# Patient Record
Sex: FEMALE | Race: WHITE | Employment: FULL TIME | URBAN - METROPOLITAN AREA
[De-identification: names, ages, dates, MRNs, and addresses within clinical notes are randomized per-mention and may not be internally consistent; named-entity substitution may affect disease eponyms.]

---

## 2017-02-09 PROBLEM — R10.11 RIGHT UPPER QUADRANT ABDOMINAL PAIN: Status: ACTIVE | Noted: 2017-02-09

## 2017-02-09 PROBLEM — I10 ESSENTIAL HYPERTENSION: Status: ACTIVE | Noted: 2017-02-09

## 2017-02-09 PROBLEM — K76.6 PORTAL HYPERTENSION (HCC): Status: ACTIVE | Noted: 2017-02-09

## 2017-02-09 PROBLEM — R79.89 ELEVATED LFTS: Status: ACTIVE | Noted: 2017-02-09

## 2017-02-09 PROBLEM — K74.60 CIRRHOSIS OF LIVER (HCC): Status: ACTIVE | Noted: 2017-02-09

## 2017-02-09 PROBLEM — F32.A DEPRESSION: Status: ACTIVE | Noted: 2017-02-09

## 2017-02-09 PROBLEM — K81.0 ACUTE CHOLECYSTITIS: Status: ACTIVE | Noted: 2017-02-09

## 2019-08-15 ENCOUNTER — APPOINTMENT (OUTPATIENT)
Dept: GENERAL RADIOLOGY | Age: 64
End: 2019-08-15
Payer: COMMERCIAL

## 2019-08-15 ENCOUNTER — HOSPITAL ENCOUNTER (EMERGENCY)
Age: 64
Discharge: HOME | End: 2019-08-15
Payer: COMMERCIAL

## 2019-08-15 VITALS
OXYGEN SATURATION: 98 % | DIASTOLIC BLOOD PRESSURE: 88 MMHG | WEIGHT: 175 LBS | HEIGHT: 66 IN | SYSTOLIC BLOOD PRESSURE: 150 MMHG | HEART RATE: 70 BPM | BODY MASS INDEX: 28.12 KG/M2 | TEMPERATURE: 98.3 F | RESPIRATION RATE: 14 BRPM

## 2019-08-15 DIAGNOSIS — M67.911 ROTATOR CUFF DYSFUNCTION, RIGHT: ICD-10-CM

## 2019-08-15 DIAGNOSIS — M25.511 RIGHT SHOULDER PAIN, UNSPECIFIED CHRONICITY: Primary | ICD-10-CM

## 2019-08-15 PROCEDURE — 99283 EMERGENCY DEPT VISIT LOW MDM: CPT

## 2019-08-15 PROCEDURE — 73030 X-RAY EXAM OF SHOULDER: CPT

## 2019-08-15 RX ORDER — NAPROXEN 500 MG/1
500 TABLET ORAL 2 TIMES DAILY
Qty: 60 TABLET | Refills: 0 | Status: SHIPPED | OUTPATIENT
Start: 2019-08-15

## 2019-08-15 RX ORDER — NAPROXEN 500 MG/1
500 TABLET ORAL 2 TIMES DAILY
Qty: 60 TABLET | Refills: 0 | Status: SHIPPED | OUTPATIENT
Start: 2019-08-15 | End: 2019-08-15 | Stop reason: SDUPTHER

## 2019-08-15 ASSESSMENT — PAIN DESCRIPTION - LOCATION: LOCATION: ARM

## 2019-08-15 ASSESSMENT — PAIN SCALES - GENERAL: PAINLEVEL_OUTOF10: 5

## 2019-08-15 ASSESSMENT — PAIN DESCRIPTION - ONSET: ONSET: ON-GOING

## 2019-08-15 ASSESSMENT — PAIN DESCRIPTION - PAIN TYPE: TYPE: ACUTE PAIN

## 2019-08-15 ASSESSMENT — PAIN DESCRIPTION - DESCRIPTORS: DESCRIPTORS: BURNING;ACHING

## 2019-08-15 ASSESSMENT — PAIN DESCRIPTION - FREQUENCY: FREQUENCY: CONTINUOUS

## 2019-08-15 ASSESSMENT — PAIN DESCRIPTION - ORIENTATION: ORIENTATION: RIGHT

## 2019-08-15 ASSESSMENT — PAIN DESCRIPTION - PROGRESSION: CLINICAL_PROGRESSION: GRADUALLY WORSENING

## 2022-03-15 ENCOUNTER — OFFICE VISIT (OUTPATIENT)
Dept: FAMILY MEDICINE CLINIC | Age: 67
End: 2022-03-15
Payer: COMMERCIAL

## 2022-03-15 VITALS
OXYGEN SATURATION: 98 % | BODY MASS INDEX: 30.92 KG/M2 | HEART RATE: 76 BPM | DIASTOLIC BLOOD PRESSURE: 82 MMHG | WEIGHT: 192.4 LBS | TEMPERATURE: 97.7 F | HEIGHT: 66 IN | SYSTOLIC BLOOD PRESSURE: 128 MMHG | RESPIRATION RATE: 16 BRPM

## 2022-03-15 DIAGNOSIS — F32.0 MILD MAJOR DEPRESSION (HCC): ICD-10-CM

## 2022-03-15 DIAGNOSIS — K76.6 PORTAL HYPERTENSION (HCC): ICD-10-CM

## 2022-03-15 DIAGNOSIS — I10 ESSENTIAL HYPERTENSION: Primary | ICD-10-CM

## 2022-03-15 DIAGNOSIS — F41.9 ANXIETY: ICD-10-CM

## 2022-03-15 DIAGNOSIS — Z13.31 POSITIVE DEPRESSION SCREENING: ICD-10-CM

## 2022-03-15 DIAGNOSIS — I10 ESSENTIAL HYPERTENSION: ICD-10-CM

## 2022-03-15 DIAGNOSIS — K70.30 ALCOHOLIC CIRRHOSIS OF LIVER WITHOUT ASCITES (HCC): ICD-10-CM

## 2022-03-15 LAB
ALBUMIN SERPL-MCNC: 4.5 G/DL (ref 3.5–5.2)
ALP BLD-CCNC: 131 U/L (ref 35–104)
ALT SERPL-CCNC: 20 U/L (ref 0–32)
ANION GAP SERPL CALCULATED.3IONS-SCNC: 11 MMOL/L (ref 7–16)
AST SERPL-CCNC: 33 U/L (ref 0–31)
BASOPHILS ABSOLUTE: 0.03 E9/L (ref 0–0.2)
BASOPHILS RELATIVE PERCENT: 0.5 % (ref 0–2)
BILIRUB SERPL-MCNC: 0.6 MG/DL (ref 0–1.2)
BUN BLDV-MCNC: 16 MG/DL (ref 6–23)
CALCIUM SERPL-MCNC: 10 MG/DL (ref 8.6–10.2)
CHLORIDE BLD-SCNC: 104 MMOL/L (ref 98–107)
CHOLESTEROL, TOTAL: 172 MG/DL (ref 0–199)
CO2: 25 MMOL/L (ref 22–29)
CREAT SERPL-MCNC: 0.6 MG/DL (ref 0.5–1)
EOSINOPHILS ABSOLUTE: 0.06 E9/L (ref 0.05–0.5)
EOSINOPHILS RELATIVE PERCENT: 1.1 % (ref 0–6)
GFR AFRICAN AMERICAN: >60
GFR NON-AFRICAN AMERICAN: >60 ML/MIN/1.73
GLUCOSE BLD-MCNC: 137 MG/DL (ref 74–99)
HCT VFR BLD CALC: 47.2 % (ref 34–48)
HDLC SERPL-MCNC: 52 MG/DL
HEMOGLOBIN: 15.1 G/DL (ref 11.5–15.5)
IMMATURE GRANULOCYTES #: 0.02 E9/L
IMMATURE GRANULOCYTES %: 0.4 % (ref 0–5)
LDL CHOLESTEROL CALCULATED: 108 MG/DL (ref 0–99)
LYMPHOCYTES ABSOLUTE: 1.18 E9/L (ref 1.5–4)
LYMPHOCYTES RELATIVE PERCENT: 21.2 % (ref 20–42)
MCH RBC QN AUTO: 29.7 PG (ref 26–35)
MCHC RBC AUTO-ENTMCNC: 32 % (ref 32–34.5)
MCV RBC AUTO: 92.7 FL (ref 80–99.9)
MONOCYTES ABSOLUTE: 0.49 E9/L (ref 0.1–0.95)
MONOCYTES RELATIVE PERCENT: 8.8 % (ref 2–12)
NEUTROPHILS ABSOLUTE: 3.78 E9/L (ref 1.8–7.3)
NEUTROPHILS RELATIVE PERCENT: 68 % (ref 43–80)
PDW BLD-RTO: 14.1 FL (ref 11.5–15)
PLATELET # BLD: 86 E9/L (ref 130–450)
PLATELET CONFIRMATION: NORMAL
PMV BLD AUTO: 12.6 FL (ref 7–12)
POTASSIUM SERPL-SCNC: 4 MMOL/L (ref 3.5–5)
RBC # BLD: 5.09 E12/L (ref 3.5–5.5)
SODIUM BLD-SCNC: 140 MMOL/L (ref 132–146)
TOTAL PROTEIN: 7.3 G/DL (ref 6.4–8.3)
TRIGL SERPL-MCNC: 60 MG/DL (ref 0–149)
VLDLC SERPL CALC-MCNC: 12 MG/DL
WBC # BLD: 5.6 E9/L (ref 4.5–11.5)

## 2022-03-15 PROCEDURE — 99204 OFFICE O/P NEW MOD 45 MIN: CPT | Performed by: INTERNAL MEDICINE

## 2022-03-15 RX ORDER — BUPROPION HYDROCHLORIDE 150 MG/1
150 TABLET ORAL EVERY MORNING
Qty: 30 TABLET | Refills: 2 | Status: SHIPPED
Start: 2022-03-15 | End: 2022-03-31 | Stop reason: SDUPTHER

## 2022-03-15 RX ORDER — BUPROPION HYDROCHLORIDE 150 MG/1
150 TABLET ORAL EVERY MORNING
COMMUNITY
End: 2022-03-15 | Stop reason: SDUPTHER

## 2022-03-15 RX ORDER — PROPRANOLOL HYDROCHLORIDE 40 MG/1
40 TABLET ORAL 2 TIMES DAILY
Qty: 180 TABLET | Refills: 3 | Status: SHIPPED | OUTPATIENT
Start: 2022-03-15 | End: 2023-03-10

## 2022-03-15 RX ORDER — PROPRANOLOL HYDROCHLORIDE 40 MG/1
40 TABLET ORAL 2 TIMES DAILY
COMMUNITY
End: 2022-03-15 | Stop reason: SDUPTHER

## 2022-03-15 RX ORDER — BUPROPION HYDROCHLORIDE 150 MG/1
150 TABLET ORAL EVERY MORNING
Qty: 30 TABLET | Status: CANCELLED | OUTPATIENT
Start: 2022-03-15

## 2022-03-15 SDOH — ECONOMIC STABILITY: FOOD INSECURITY: WITHIN THE PAST 12 MONTHS, THE FOOD YOU BOUGHT JUST DIDN'T LAST AND YOU DIDN'T HAVE MONEY TO GET MORE.: NEVER TRUE

## 2022-03-15 SDOH — ECONOMIC STABILITY: FOOD INSECURITY: WITHIN THE PAST 12 MONTHS, YOU WORRIED THAT YOUR FOOD WOULD RUN OUT BEFORE YOU GOT MONEY TO BUY MORE.: NEVER TRUE

## 2022-03-15 ASSESSMENT — PATIENT HEALTH QUESTIONNAIRE - PHQ9
SUM OF ALL RESPONSES TO PHQ QUESTIONS 1-9: 20
SUM OF ALL RESPONSES TO PHQ QUESTIONS 1-9: 20
9. THOUGHTS THAT YOU WOULD BE BETTER OFF DEAD, OR OF HURTING YOURSELF: 0
3. TROUBLE FALLING OR STAYING ASLEEP: 3
4. FEELING TIRED OR HAVING LITTLE ENERGY: 3
2. FEELING DOWN, DEPRESSED OR HOPELESS: 3
7. TROUBLE CONCENTRATING ON THINGS, SUCH AS READING THE NEWSPAPER OR WATCHING TELEVISION: 3
8. MOVING OR SPEAKING SO SLOWLY THAT OTHER PEOPLE COULD HAVE NOTICED. OR THE OPPOSITE, BEING SO FIGETY OR RESTLESS THAT YOU HAVE BEEN MOVING AROUND A LOT MORE THAN USUAL: 0
SUM OF ALL RESPONSES TO PHQ QUESTIONS 1-9: 20
SUM OF ALL RESPONSES TO PHQ QUESTIONS 1-9: 20
6. FEELING BAD ABOUT YOURSELF - OR THAT YOU ARE A FAILURE OR HAVE LET YOURSELF OR YOUR FAMILY DOWN: 3
5. POOR APPETITE OR OVEREATING: 2
1. LITTLE INTEREST OR PLEASURE IN DOING THINGS: 3
10. IF YOU CHECKED OFF ANY PROBLEMS, HOW DIFFICULT HAVE THESE PROBLEMS MADE IT FOR YOU TO DO YOUR WORK, TAKE CARE OF THINGS AT HOME, OR GET ALONG WITH OTHER PEOPLE: 3
SUM OF ALL RESPONSES TO PHQ9 QUESTIONS 1 & 2: 6

## 2022-03-15 ASSESSMENT — COLUMBIA-SUICIDE SEVERITY RATING SCALE - C-SSRS
6. HAVE YOU EVER DONE ANYTHING, STARTED TO DO ANYTHING, OR PREPARED TO DO ANYTHING TO END YOUR LIFE?: NO
2. HAVE YOU ACTUALLY HAD ANY THOUGHTS OF KILLING YOURSELF?: NO
1. WITHIN THE PAST MONTH, HAVE YOU WISHED YOU WERE DEAD OR WISHED YOU COULD GO TO SLEEP AND NOT WAKE UP?: NO

## 2022-03-15 ASSESSMENT — SOCIAL DETERMINANTS OF HEALTH (SDOH): HOW HARD IS IT FOR YOU TO PAY FOR THE VERY BASICS LIKE FOOD, HOUSING, MEDICAL CARE, AND HEATING?: NOT HARD AT ALL

## 2022-03-15 NOTE — PROGRESS NOTES
[facility]  [address] [facility]  [place] [address]  Dept: [phone]  Dept Fax: [phone]     NAME: Zeb Schilder        :  1955        MRN:  [id number]    Chief Complaint   Patient presents with   [facility]     former Dr. Jasiel Harvey pt,     Depression     wants to get back on antidepressent     Anxiety       History of Present Illness  Zeb Schilder is a 61 y.o. female who presents today to establish care. Prior PCP: Dr. Denise Zarate but has not been seen in years since 2018. Alcoholic Liver cirrhosis and portal HTN with varices - dx before   Sober for 14 years now. Had a liver specialist a while back, not currently. Patient has had a long history of depression. She has been on several antidepressants in the past.  She has been tolerating Wellbutrin well. Specifically she remembers Paxil having negative side effects for her. Review of Systems  Please see HPI above. All bolded are positive.   Gen: fever, chills, fatigue, weakness, diaphoresis, or unintentional weight change  Head: headache, vision change, hearing loss  Chest: chest pain/heaviness, palpitations  Lungs: shortness of breath, wheezing, coughing, hemoptysis  Abdomen: abdominal pain, nausea, vomiting, diarrhea, constipation, melena, hematochezia, hematemesis, or loss of appetite  Extremities: lower extremity edema, myalgias, arthralgias  Urinary: dysuria, hematuria, weak flow, or increase in frequency  Neurologic: lightheadedness, dizziness, confusion, syncope  Endocrine: polydipsia, polyuria, heat or cold intolerance  Psychiatric: depression, suicidal ideation, anxiety  Derm: Rashes, ulcers, burns    Medical History   Past Medical History:   Diagnosis Date    Depression     Hypertension        Surgical History   Past Surgical History:   Procedure Laterality Date    BREAST SURGERY      cyst    CHOLECYSTECTOMY  2017    lap with Dr Marshall Pollack  OVARY REMOVAL      left       Family History  No family history on file. Social History  Social History     Tobacco Use    Smoking status: Current Every Day Smoker     Packs/day: 0.75     Types: Cigarettes    Smokeless tobacco: Never Used   Substance Use Topics    Alcohol use: No       Home Medications  Current Outpatient Medications   Medication Sig Dispense Refill    propranolol (INDERAL) 40 MG tablet Take 1 tablet by mouth 2 times daily 180 tablet 3    buPROPion (WELLBUTRIN XL) 150 MG extended release tablet Take 1 tablet by mouth every morning 30 tablet 2    naproxen (NAPROSYN) 500 MG tablet Take 1 tablet by mouth 2 times daily 60 tablet 0     No current facility-administered medications for this visit. Allergies  Allergies   Allergen Reactions    Paxil [Paroxetine]     Penicillins        Objective  Vitals:    03/15/22 1036 03/15/22 1123   BP: (!) 158/78 128/82   Pulse: 76    Resp: 16    Temp: 97.7 °F (36.5 °C)    TempSrc: Infrared    SpO2: 98%    Weight: 192 lb 6.4 oz (87.3 kg)    Height: 5' 6\" (1.676 m)         Physical Exam:  General: Awake, alert, and oriented to person, place, time, and purpose, appears stated age and cooperative, No acute distress  Head: Normocephalic, atraumatic  Eyes: conjunctivae/corneas clear, EOM's intact. Mouth: Mucous membranes moist with no pharyngeal exudate or erythema  Neck: no JVD, no adenopathy, no carotid bruit, supple, symmetrical, trachea midline  Back: symmetric, ROM normal, No CVA tenderness. Lungs: clear to auscultation bilaterally without wheezes, rales, or rhonchi  Heart: regular rate and rhythm, S1, S2 normal, no murmur, click, rub or gallop  Abdomen: soft, non-tender; bowel sounds normal; no masses,  no organomegaly  Extremities: atraumatic, no cyanosis, no edema  Skin: color, texture, turgor within normal limits.  No rashes or lesions  Neurologic: speech appropriate, moves all 4 extremities, normal muscle strength and tone, CN 2-12 grossly intact    Labs  Lab Results   Component Value Date    WBC 5.6 03/15/2022    HGB 15.1 03/15/2022    HCT 47.2 03/15/2022    PLT 86 (L) 03/15/2022     03/15/2022    K 4.0 03/15/2022     03/15/2022    CREATININE 0.6 03/15/2022    BUN 16 03/15/2022    CO2 25 03/15/2022    GLUCOSE 137 (H) 03/15/2022    ALT 20 03/15/2022    AST 33 (H) 03/15/2022    INR 1.2 02/08/2017     No results found for: TSH  Lab Results   Component Value Date    TRIG 60 03/15/2022     Lab Results   Component Value Date    HDL 52 03/15/2022     Lab Results   Component Value Date    LDLCALC 108 (H) 03/15/2022     No results found for: LABA1C  Lab Results   Component Value Date    INR 1.2 02/08/2017    PROTIME 13.2 (H) 02/08/2017      *All recent labs were reviewed. Please see electronic chart for a more comprehensive set of labs    Radiology  No results found. Health Maintenance Due   Topic Date Due    Hepatitis C screen  Never done    Hepatitis A vaccine (1 of 2 - Risk 2-dose series) Never done    COVID-19 Vaccine (1) Never done    Pneumococcal 0-64 years Vaccine (1 of 2 - PPSV23) Never done    HIV screen  Never done    Hepatitis B vaccine (1 of 3 - Risk 3-dose series) Never done    DTaP/Tdap/Td vaccine (1 - Tdap) Never done    Cervical cancer screen  Never done    Diabetes screen  Never done    Colorectal Cancer Screen  Never done    Breast cancer screen  Never done    Shingles Vaccine (1 of 2) Never done    Flu vaccine (1) Never done         Assessment and Plan  Garett Patel presents today to establish care. Nati Kaplan was seen today for new patient, depression and anxiety. Diagnoses and all orders for this visit:    Essential hypertension  -     CBC with Auto Differential; Future  -     Comprehensive Metabolic Panel; Future  -     Lipid Panel; Future    Alcoholic cirrhosis of liver without ascites (HCC)  -     Comprehensive Metabolic Panel; Future  -     Lipid Panel;  Future    Portal hypertension (HCC)  -     propranolol (INDERAL) 40 MG tablet; Take 1 tablet by mouth 2 times daily    Mild major depression (HCC)  -     buPROPion (WELLBUTRIN XL) 150 MG extended release tablet; Take 1 tablet by mouth every morning  -     External Referral To Counseling Services    Positive depression screening  -     buPROPion (WELLBUTRIN XL) 150 MG extended release tablet; Take 1 tablet by mouth every morning    Anxiety    PHQ-9 score today: (PHQ-9 Total Score: 20), additional evaluation and assessment performed, follow-up plan includes but not limited to: Medication management and Referral to /Specialist  for evaluation and management. Educational materials and/or home exercises printed for patient's review and were included in patient instructions on his/her After Visit Summary and given to patient at the end of visit. Counseled regarding above diagnosis, including possible risks and complications, especially if left uncontrolled. Counseled regarding the possible side effects, risks, benefits and alternatives to treatment; patient and/or guardian verbalizes understanding, agrees, feels comfortable with and wishes to proceed with above treatment plan. Advised patient to call [facility] new medication issues, and read all Rx info from pharmacy to assure aware of all possible risks and side effects of medication before taking. Patient verbalizes understanding and agrees with above counseling, assessment and plan. All questions answered.     Kiel Farley, DO

## 2022-03-31 ENCOUNTER — OFFICE VISIT (OUTPATIENT)
Dept: FAMILY MEDICINE CLINIC | Age: 67
End: 2022-03-31
Payer: COMMERCIAL

## 2022-03-31 VITALS
HEART RATE: 51 BPM | RESPIRATION RATE: 16 BRPM | WEIGHT: 190.6 LBS | DIASTOLIC BLOOD PRESSURE: 74 MMHG | OXYGEN SATURATION: 97 % | TEMPERATURE: 97.3 F | SYSTOLIC BLOOD PRESSURE: 132 MMHG | HEIGHT: 65 IN | BODY MASS INDEX: 31.75 KG/M2

## 2022-03-31 DIAGNOSIS — F32.0 MILD MAJOR DEPRESSION (HCC): Primary | ICD-10-CM

## 2022-03-31 DIAGNOSIS — Z13.31 POSITIVE DEPRESSION SCREENING: ICD-10-CM

## 2022-03-31 DIAGNOSIS — K76.6 PORTAL HYPERTENSION (HCC): ICD-10-CM

## 2022-03-31 DIAGNOSIS — I10 ESSENTIAL HYPERTENSION: ICD-10-CM

## 2022-03-31 PROCEDURE — 99214 OFFICE O/P EST MOD 30 MIN: CPT | Performed by: INTERNAL MEDICINE

## 2022-03-31 RX ORDER — BUPROPION HYDROCHLORIDE 300 MG/1
300 TABLET ORAL EVERY MORNING
Qty: 30 TABLET | Refills: 2 | Status: SHIPPED | OUTPATIENT
Start: 2022-03-31 | End: 2022-06-29

## 2022-03-31 NOTE — PROGRESS NOTES
[facility]  [address] [place] [address]  [place] [address]  Dept: [phone]  Dept Fax: [phone]     NAME: Shiv Campoverde        :  1955        MRN:  [id number]    Chief Complaint   Patient presents with    Follow-up    Hypertension     Propranolol seems to be working well    Depression     may need increase in Wellbutrin        Subjective     History of Present Illness  Shiv Campoverde is a 61 y.o. female who presents today for routine follow up and medication refill. Patient notes that she is doing better on the wellbutrin, but thinks that the higher dose will work better for her. Blood pressure has been doing good. Review of Systems  Please see HPI above. All bolded are positive. Gen: fever, chills, fatigue, weakness, diaphoresis, unintentional weight change  Head: headache, vision change, hearing loss  Chest: chest pain/heaviness, palpitations  Lungs: shortness of breath, wheezing, coughing, hemoptysis  Abdomen: abdominal pain, nausea, vomiting, diarrhea, constipation, melena, hematochezia, hematemesis, loss of appetite  Extremities: lower extremity edema, myalgias, arthralgias  Urinary: dysuria, hematuria, weak flow, increase in frequency  Neurologic: lightheadedness, dizziness, confusion, syncope  Endocrine: polydipsia, polyuria, heat or cold intolerance  Psychiatric: depression, suicidal ideation, anxiety  Derm: Rashes, ulcers, burns    Past Medical Hx:  Past Medical History:   Diagnosis Date    Depression     Hypertension        Past Surgical Hx:  Past Surgical History:   Procedure Laterality Date    BREAST SURGERY      cyst    CHOLECYSTECTOMY  2017    lap with Dr Ramandeep Lopez      left       Family Hx:  No family history on file.     Social Hx:  Social History     Tobacco Use    Smoking status: Current Every Day Smoker     Packs/day: 0.75     Types: Cigarettes    Smokeless tobacco: Never Used Substance Use Topics    Alcohol use: No       Home Medications:  Current Outpatient Medications   Medication Sig Dispense Refill    buPROPion (WELLBUTRIN XL) 300 MG extended release tablet Take 1 tablet by mouth every morning 30 tablet 2    propranolol (INDERAL) 40 MG tablet Take 1 tablet by mouth 2 times daily 180 tablet 3    naproxen (NAPROSYN) 500 MG tablet Take 1 tablet by mouth 2 times daily 60 tablet 0     No current facility-administered medications for this visit. Allergies: Allergies   Allergen Reactions    Paxil [Paroxetine]     Penicillins        Objective     Vitals:    03/31/22 1110   BP: 132/74   Pulse: 51   Resp: 16   Temp: 97.3 °F (36.3 °C)   TempSrc: Infrared   SpO2: 97%   Weight: 190 lb 9.6 oz (86.5 kg)   Height: 5' 5\" (1.651 m)        Physical Exam  General: Awake, alert, and oriented to person, place, time, and purpose, appears stated age and cooperative, No acute distress  Head: Normocephalic, atraumatic  Eyes: conjunctivae/corneas clear, EOMI  Mouth: Mucous membranes moist with no pharyngeal exudate or erythema  Neck: no JVD, no adenopathy, no carotid bruit, supple, symmetrical, trachea midline  Back: symmetric, ROM normal, No CVA tenderness. Lungs: clear to auscultation bilaterally without wheezes, rales, or rhonchi  Heart: regular rate and rhythm, S1, S2 normal, no murmur, click, rub or gallop  Abdomen: soft, non-tender; bowel sounds normal; no masses,  no organomegaly  Extremities: atraumatic, no cyanosis, no edema, 2+ pulses palpated in all 4 extremities  Skin: color, texture, turgor within normal limits.  No rashes or lesions   Neurologic:speech appropriate, moves all 4 extremities, normal muscle strength and tone, CN 2-12 grossly intact    Labs:  Lab Results   Component Value Date    WBC 5.6 03/15/2022    HGB 15.1 03/15/2022    HCT 47.2 03/15/2022    PLT 86 (L) 03/15/2022     03/15/2022    K 4.0 03/15/2022     03/15/2022    CREATININE 0.6 03/15/2022    BUN 16 03/15/2022    CO2 25 03/15/2022    GLUCOSE 137 (H) 03/15/2022    ALT 20 03/15/2022    AST 33 (H) 03/15/2022    INR 1.2 02/08/2017     No results found for: TSH  Lab Results   Component Value Date    TRIG 60 03/15/2022     Lab Results   Component Value Date    HDL 52 03/15/2022     Lab Results   Component Value Date    LDLCALC 108 (H) 03/15/2022     No results found for: LABA1C  Lab Results   Component Value Date    INR 1.2 02/08/2017    PROTIME 13.2 (H) 02/08/2017      *All recent labs were reviewed. Please see electronic chart for a more comprehensive set of labs    Radiology:  No results found. Assessment and Plan     Patient is a 61 y.o. female who presented to the office for follow up. Full problem list is as follows:  Patient Active Problem List   Diagnosis    Right upper quadrant abdominal pain    Acute cholecystitis    Elevated LFTs    [facility]    Cirrhosis of liver ([facility]    Essential hypertension    Judith Cr was seen today for follow-up, hypertension and depression. Diagnoses and all orders for this visit:    Mild major depression (HCC)  -     buPROPion (WELLBUTRIN XL) 300 MG extended release tablet; Take 1 tablet by mouth every morning    Positive depression screening  -     buPROPion (WELLBUTRIN XL) 300 MG extended release tablet; Take 1 tablet by mouth every morning    Essential hypertension    Portal hypertension (HCC)    - blood pressure stable continue same medication. Educational materials and/or home exercises printed for patient's review and were included in patient instructions on his/her After Visit Summary and given to patient at the end of visit. Counseled regarding above diagnosis, including possible risks and complications, especially if left uncontrolled.      Counseled regarding the possible side effects, risks, benefits and alternatives to treatment; patient and/or guardian verbalizes understanding, agrees, feels comfortable with and wishes to proceed with above treatment plan. Advised patient to call Jona Osman new medication issues, and read all Rx info from pharmacy to assure aware of all possible risks and side effects of any medication before taking     Patient verbalizes understanding and agrees with above counseling, assessment and plan. All questions answered.     Yogesh Del Real, DO

## 2022-12-19 ENCOUNTER — COMMUNITY OUTREACH (OUTPATIENT)
Dept: FAMILY MEDICINE CLINIC | Age: 67
End: 2022-12-19

## 2023-03-09 ENCOUNTER — TELEPHONE (OUTPATIENT)
Dept: FAMILY MEDICINE CLINIC | Age: 68
End: 2023-03-09

## 2023-03-09 NOTE — TELEPHONE ENCOUNTER
I left a voicemail informing her to call the office to schedule if she still needs an appointment      ----- Message from [facility] sent at 3/9/2023 11:55 AM EST -----  Subject: Appointment Request    Reason for Call: Established Patient Appointment needed: Routine Existing   Condition Follow Up    QUESTIONS    Reason for appointment request? Available appointments did not meet   patient need     Additional Information for Provider?  ##Patient Needs A Sooner Appt## Med   Refills Needed   ---------------------------------------------------------------------------  --------------  Kennedy MOREJON  [phone]; OK to leave message on voicemail  ---------------------------------------------------------------------------  --------------  SCRIPT ANSWERS  COVID Screen: Parrish Wallace

## 2023-03-16 DIAGNOSIS — K76.6 PORTAL HYPERTENSION (HCC): ICD-10-CM

## 2023-03-16 RX ORDER — PROPRANOLOL HYDROCHLORIDE 40 MG/1
40 TABLET ORAL 2 TIMES DAILY
Qty: 180 TABLET | Refills: 0 | Status: SHIPPED | OUTPATIENT
Start: 2023-03-16

## 2023-03-16 NOTE — TELEPHONE ENCOUNTER
Last Appointment:  3/31/2022  Future Appointments   Date Time Provider Marco Clayton   4/17/2023  1:45 PM Machelle Palacio,  [address]

## 2023-03-16 NOTE — TELEPHONE ENCOUNTER
----- Message from [facility] sent at 3/16/2023  3:16 PM EDT -----  Subject: Refill Request    QUESTIONS  Name of Medication? propranolol (INDERAL) 40 MG tablet  Patient-reported dosage and instructions? 2x a day   How many days do you have left? 7  Preferred Pharmacy? [address] phone number (if available)? [phone]  ---------------------------------------------------------------------------  --------------  CALL BACK INFO  What is the best way for the office to contact you? OK to leave message on   voicemail  Preferred Call Back Phone Number? [phone]  ---------------------------------------------------------------------------  --------------  SCRIPT ANSWERS  Relationship to Patient?  Self

## 2023-04-17 ENCOUNTER — OFFICE VISIT (OUTPATIENT)
Dept: FAMILY MEDICINE CLINIC | Age: 68
End: 2023-04-17
Payer: COMMERCIAL

## 2023-04-17 VITALS
DIASTOLIC BLOOD PRESSURE: 86 MMHG | HEIGHT: 65 IN | BODY MASS INDEX: 35.99 KG/M2 | TEMPERATURE: 97.6 F | WEIGHT: 216 LBS | RESPIRATION RATE: 18 BRPM | HEART RATE: 55 BPM | SYSTOLIC BLOOD PRESSURE: 132 MMHG | OXYGEN SATURATION: 95 %

## 2023-04-17 DIAGNOSIS — F32.89 OTHER DEPRESSION: Primary | ICD-10-CM

## 2023-04-17 DIAGNOSIS — K76.6 PORTAL HYPERTENSION (HCC): ICD-10-CM

## 2023-04-17 DIAGNOSIS — I10 ESSENTIAL HYPERTENSION: ICD-10-CM

## 2023-04-17 DIAGNOSIS — G89.29 CHRONIC PAIN OF RIGHT KNEE: ICD-10-CM

## 2023-04-17 DIAGNOSIS — Z12.11 COLON CANCER SCREENING: ICD-10-CM

## 2023-04-17 DIAGNOSIS — Z12.31 BREAST CANCER SCREENING BY MAMMOGRAM: ICD-10-CM

## 2023-04-17 DIAGNOSIS — G47.00 INSOMNIA, UNSPECIFIED TYPE: ICD-10-CM

## 2023-04-17 DIAGNOSIS — K70.30 ALCOHOLIC CIRRHOSIS OF LIVER WITHOUT ASCITES (HCC): ICD-10-CM

## 2023-04-17 DIAGNOSIS — M25.561 CHRONIC PAIN OF RIGHT KNEE: ICD-10-CM

## 2023-04-17 PROCEDURE — G8419 CALC BMI OUT NRM PARAM NOF/U: HCPCS | Performed by: INTERNAL MEDICINE

## 2023-04-17 PROCEDURE — 4004F PT TOBACCO SCREEN RCVD TLK: CPT | Performed by: INTERNAL MEDICINE

## 2023-04-17 PROCEDURE — 3017F COLORECTAL CA SCREEN DOC REV: CPT | Performed by: INTERNAL MEDICINE

## 2023-04-17 PROCEDURE — 99214 OFFICE O/P EST MOD 30 MIN: CPT | Performed by: INTERNAL MEDICINE

## 2023-04-17 PROCEDURE — 3079F DIAST BP 80-89 MM HG: CPT | Performed by: INTERNAL MEDICINE

## 2023-04-17 PROCEDURE — 1090F PRES/ABSN URINE INCON ASSESS: CPT | Performed by: INTERNAL MEDICINE

## 2023-04-17 PROCEDURE — G8400 PT W/DXA NO RESULTS DOC: HCPCS | Performed by: INTERNAL MEDICINE

## 2023-04-17 PROCEDURE — 3075F SYST BP GE 130 - 139MM HG: CPT | Performed by: INTERNAL MEDICINE

## 2023-04-17 PROCEDURE — G8427 DOCREV CUR MEDS BY ELIG CLIN: HCPCS | Performed by: INTERNAL MEDICINE

## 2023-04-17 PROCEDURE — 1123F ACP DISCUSS/DSCN MKR DOCD: CPT | Performed by: INTERNAL MEDICINE

## 2023-04-17 RX ORDER — PROPRANOLOL HYDROCHLORIDE 40 MG/1
40 TABLET ORAL 2 TIMES DAILY
Qty: 180 TABLET | Refills: 3 | Status: SHIPPED | OUTPATIENT
Start: 2023-04-17

## 2023-04-17 RX ORDER — NAPROXEN 500 MG/1
500 TABLET ORAL 2 TIMES DAILY
Qty: 60 TABLET | Refills: 5 | Status: SHIPPED | OUTPATIENT
Start: 2023-04-17

## 2023-04-17 RX ORDER — TRAZODONE HYDROCHLORIDE 50 MG/1
50 TABLET ORAL NIGHTLY
Qty: 90 TABLET | Refills: 1 | Status: SHIPPED | OUTPATIENT
Start: 2023-04-17

## 2023-04-17 SDOH — ECONOMIC STABILITY: FOOD INSECURITY: WITHIN THE PAST 12 MONTHS, YOU WORRIED THAT YOUR FOOD WOULD RUN OUT BEFORE YOU GOT MONEY TO BUY MORE.: NEVER TRUE

## 2023-04-17 SDOH — ECONOMIC STABILITY: INCOME INSECURITY: HOW HARD IS IT FOR YOU TO PAY FOR THE VERY BASICS LIKE FOOD, HOUSING, MEDICAL CARE, AND HEATING?: NOT HARD AT ALL

## 2023-04-17 SDOH — ECONOMIC STABILITY: HOUSING INSECURITY
IN THE LAST 12 MONTHS, WAS THERE A TIME WHEN YOU DID NOT HAVE A STEADY PLACE TO SLEEP OR SLEPT IN A SHELTER (INCLUDING NOW)?: NO

## 2023-04-17 SDOH — ECONOMIC STABILITY: FOOD INSECURITY: WITHIN THE PAST 12 MONTHS, THE FOOD YOU BOUGHT JUST DIDN'T LAST AND YOU DIDN'T HAVE MONEY TO GET MORE.: NEVER TRUE

## 2023-04-17 ASSESSMENT — PATIENT HEALTH QUESTIONNAIRE - PHQ9
7. TROUBLE CONCENTRATING ON THINGS, SUCH AS READING THE NEWSPAPER OR WATCHING TELEVISION: 0
SUM OF ALL RESPONSES TO PHQ QUESTIONS 1-9: 11
SUM OF ALL RESPONSES TO PHQ9 QUESTIONS 1 & 2: 2
SUM OF ALL RESPONSES TO PHQ QUESTIONS 1-9: 11
8. MOVING OR SPEAKING SO SLOWLY THAT OTHER PEOPLE COULD HAVE NOTICED. OR THE OPPOSITE, BEING SO FIGETY OR RESTLESS THAT YOU HAVE BEEN MOVING AROUND A LOT MORE THAN USUAL: 0
6. FEELING BAD ABOUT YOURSELF - OR THAT YOU ARE A FAILURE OR HAVE LET YOURSELF OR YOUR FAMILY DOWN: 3
9. THOUGHTS THAT YOU WOULD BE BETTER OFF DEAD, OR OF HURTING YOURSELF: 0
2. FEELING DOWN, DEPRESSED OR HOPELESS: 1
10. IF YOU CHECKED OFF ANY PROBLEMS, HOW DIFFICULT HAVE THESE PROBLEMS MADE IT FOR YOU TO DO YOUR WORK, TAKE CARE OF THINGS AT HOME, OR GET ALONG WITH OTHER PEOPLE: 2
SUM OF ALL RESPONSES TO PHQ QUESTIONS 1-9: 11
4. FEELING TIRED OR HAVING LITTLE ENERGY: 3
SUM OF ALL RESPONSES TO PHQ QUESTIONS 1-9: 11
3. TROUBLE FALLING OR STAYING ASLEEP: 3
5. POOR APPETITE OR OVEREATING: 0
1. LITTLE INTEREST OR PLEASURE IN DOING THINGS: 1

## 2023-04-17 NOTE — PROGRESS NOTES
04/17/23 1352   BP: 132/86   Pulse: 55   Resp: 18   Temp: 97.6 °F (36.4 °C)   TempSrc: Temporal   SpO2: 95%   Weight: 216 lb (98 kg)   Height: 5' 5\" (1.651 m)        Physical Exam  General: Awake, alert, and oriented to person, place, time, and purpose, appears stated age and cooperative, No acute distress  Head: Normocephalic, atraumatic  Eyes: conjunctivae/corneas clear, EOMI  Neck:  symmetrical, trachea midline  Back: symmetric, ROM normal, No CVA tenderness. Lungs: clear to auscultation bilaterally without wheezes, rales, or rhonchi  Heart: regular rate and rhythm, S1, S2 normal, no murmur, click, rub or gallop  Abdomen: soft, non-tender; bowel sounds normal; no masses,  no organomegaly  Extremities: atraumatic, no cyanosis, no edema  Skin: color, texture, turgor within normal limits  Neurologic:speech appropriate, moves all 4 extremities, normal muscle strength and tone, CN 2-12 grossly intact    Labs:  Lab Results   Component Value Date    WBC 5.6 03/15/2022    HGB 15.1 03/15/2022    HCT 47.2 03/15/2022    PLT 86 (L) 03/15/2022     03/15/2022    K 4.0 03/15/2022     03/15/2022    CREATININE 0.6 03/15/2022    BUN 16 03/15/2022    CO2 25 03/15/2022    GLUCOSE 137 (H) 03/15/2022    ALT 20 03/15/2022    AST 33 (H) 03/15/2022    INR 1.2 02/08/2017     No results found for: TSH  Lab Results   Component Value Date    TRIG 60 03/15/2022     Lab Results   Component Value Date    HDL 52 03/15/2022     Lab Results   Component Value Date    LDLCALC 108 (H) 03/15/2022     No results found for: LABA1C  Lab Results   Component Value Date    INR 1.2 02/08/2017    PROTIME 13.2 (H) 02/08/2017      *All recent labs were reviewed. Please see electronic chart for a more comprehensive set of labs    Radiology:  No results found. Assessment and Plan     Patient is a 74 y.o. female who presented to the office for follow up.    Full problem list is as follows:  Patient Active Problem List   Diagnosis    Right upper

## 2023-06-22 ENCOUNTER — OFFICE VISIT (OUTPATIENT)
Dept: FAMILY MEDICINE CLINIC | Age: 68
End: 2023-06-22
Payer: MEDICARE

## 2023-06-22 VITALS
TEMPERATURE: 97.7 F | SYSTOLIC BLOOD PRESSURE: 138 MMHG | DIASTOLIC BLOOD PRESSURE: 88 MMHG | BODY MASS INDEX: 34.79 KG/M2 | HEIGHT: 65 IN | OXYGEN SATURATION: 96 % | RESPIRATION RATE: 18 BRPM | HEART RATE: 55 BPM | WEIGHT: 208.8 LBS

## 2023-06-22 DIAGNOSIS — F32.0 MILD MAJOR DEPRESSION (HCC): ICD-10-CM

## 2023-06-22 DIAGNOSIS — F32.89 OTHER DEPRESSION: ICD-10-CM

## 2023-06-22 DIAGNOSIS — G47.00 INSOMNIA, UNSPECIFIED TYPE: Primary | ICD-10-CM

## 2023-06-22 PROCEDURE — 3078F DIAST BP <80 MM HG: CPT | Performed by: INTERNAL MEDICINE

## 2023-06-22 PROCEDURE — 3074F SYST BP LT 130 MM HG: CPT | Performed by: INTERNAL MEDICINE

## 2023-06-22 PROCEDURE — G8400 PT W/DXA NO RESULTS DOC: HCPCS | Performed by: INTERNAL MEDICINE

## 2023-06-22 PROCEDURE — 3017F COLORECTAL CA SCREEN DOC REV: CPT | Performed by: INTERNAL MEDICINE

## 2023-06-22 PROCEDURE — 1123F ACP DISCUSS/DSCN MKR DOCD: CPT | Performed by: INTERNAL MEDICINE

## 2023-06-22 PROCEDURE — 4004F PT TOBACCO SCREEN RCVD TLK: CPT | Performed by: INTERNAL MEDICINE

## 2023-06-22 PROCEDURE — G8427 DOCREV CUR MEDS BY ELIG CLIN: HCPCS | Performed by: INTERNAL MEDICINE

## 2023-06-22 PROCEDURE — 1090F PRES/ABSN URINE INCON ASSESS: CPT | Performed by: INTERNAL MEDICINE

## 2023-06-22 PROCEDURE — G8417 CALC BMI ABV UP PARAM F/U: HCPCS | Performed by: INTERNAL MEDICINE

## 2023-06-22 PROCEDURE — 99214 OFFICE O/P EST MOD 30 MIN: CPT | Performed by: INTERNAL MEDICINE

## 2023-06-22 RX ORDER — TRAZODONE HYDROCHLORIDE 100 MG/1
100 TABLET ORAL NIGHTLY
Qty: 30 TABLET | Refills: 2 | Status: SHIPPED | OUTPATIENT
Start: 2023-06-22

## 2023-06-22 NOTE — PROGRESS NOTES
abdominal pain    Acute cholecystitis    Elevated LFTs    Portal hypertension (HCC)    Cirrhosis of liver (HCC)    Essential hypertension    Depression    Mild major depression ([facility] [address]       Kat Downs was seen today for follow-up, depression and hypertension. Diagnoses and all orders for this visit:    Insomnia, unspecified type  -     traZODone (DESYREL) 100 MG tablet; Take 1 tablet by mouth nightly  - uncontrolled, dose increased for trazodone    Mild major depression (HCC)  Other depression  -     traZODone (DESYREL) 100 MG tablet; Take 1 tablet by mouth nightly        Educational materials and/or home exercises printed for patient's review and were included in patient instructions on his/her After Visit Summary and given to patient at the end of visit. Counseled regarding above diagnosis, including possible risks and complications, especially if left uncontrolled. Counseled regarding the possible side effects, risks, benefits and alternatives to treatment; patient and/or guardian verbalizes understanding, agrees, feels comfortable with and wishes to proceed with above treatment plan. Advised patient to call Micheline Anton new medication issues, and read all Rx info from pharmacy to assure aware of all possible risks and side effects of any medication before taking. Patient verbalizes understanding and agrees with above counseling, assessment and plan. All questions answered.     Liset Remy DO

## 2023-09-27 DIAGNOSIS — F32.89 OTHER DEPRESSION: ICD-10-CM

## 2023-09-27 DIAGNOSIS — G47.00 INSOMNIA, UNSPECIFIED TYPE: ICD-10-CM

## 2023-09-27 RX ORDER — TRAZODONE HYDROCHLORIDE 100 MG/1
100 TABLET ORAL
Qty: 30 TABLET | Refills: 0 | Status: SHIPPED | OUTPATIENT
Start: 2023-09-27

## 2023-11-05 DIAGNOSIS — F32.89 OTHER DEPRESSION: ICD-10-CM

## 2023-11-05 DIAGNOSIS — G47.00 INSOMNIA, UNSPECIFIED TYPE: ICD-10-CM

## 2023-11-06 RX ORDER — TRAZODONE HYDROCHLORIDE 100 MG/1
100 TABLET ORAL
Qty: 30 TABLET | Refills: 0 | Status: SHIPPED | OUTPATIENT
Start: 2023-11-06

## 2023-12-12 DIAGNOSIS — F32.89 OTHER DEPRESSION: ICD-10-CM

## 2023-12-12 DIAGNOSIS — G47.00 INSOMNIA, UNSPECIFIED TYPE: ICD-10-CM

## 2023-12-13 RX ORDER — TRAZODONE HYDROCHLORIDE 100 MG/1
100 TABLET ORAL
Qty: 30 TABLET | Refills: 0 | Status: SHIPPED | OUTPATIENT
Start: 2023-12-13

## 2024-01-15 DIAGNOSIS — F32.89 OTHER DEPRESSION: ICD-10-CM

## 2024-01-15 DIAGNOSIS — G47.00 INSOMNIA, UNSPECIFIED TYPE: ICD-10-CM

## 2024-01-16 RX ORDER — TRAZODONE HYDROCHLORIDE 100 MG/1
100 TABLET ORAL
Qty: 30 TABLET | Refills: 3 | Status: SHIPPED | OUTPATIENT
Start: 2024-01-16

## 2024-05-17 DIAGNOSIS — F32.89 OTHER DEPRESSION: ICD-10-CM

## 2024-05-17 DIAGNOSIS — G47.00 INSOMNIA, UNSPECIFIED TYPE: ICD-10-CM

## 2024-05-17 RX ORDER — TRAZODONE HYDROCHLORIDE 100 MG/1
100 TABLET ORAL NIGHTLY
Qty: 30 TABLET | Refills: 3 | Status: SHIPPED | OUTPATIENT
Start: 2024-05-17

## 2024-08-15 ENCOUNTER — TELEPHONE (OUTPATIENT)
Dept: OTHER | Facility: CLINIC | Age: 69
End: 2024-08-15

## 2024-08-15 NOTE — TELEPHONE ENCOUNTER
Patient was outreached to as part of Population Health scheduling activity. Patient may be due for a visit with their primary care provider.    Outcome of call: PATIENT UNAVAILABLE - LEFT VOICEMAIL    [phone] (home)

## 2025-02-23 ENCOUNTER — APPOINTMENT (OUTPATIENT)
Dept: CT IMAGING | Age: 70
End: 2025-02-23
Payer: MEDICARE

## 2025-02-23 ENCOUNTER — HOSPITAL ENCOUNTER (EMERGENCY)
Age: 70
Discharge: HOME | End: 2025-02-23
Payer: MEDICARE

## 2025-02-23 VITALS
BODY MASS INDEX: 33.75 KG/M2 | RESPIRATION RATE: 16 BRPM | SYSTOLIC BLOOD PRESSURE: 182 MMHG | HEIGHT: 66 IN | OXYGEN SATURATION: 95 % | WEIGHT: 210 LBS | DIASTOLIC BLOOD PRESSURE: 92 MMHG | HEART RATE: 79 BPM | TEMPERATURE: 98.3 F

## 2025-02-23 DIAGNOSIS — R10.31 ABDOMINAL PAIN, RIGHT LOWER QUADRANT: ICD-10-CM

## 2025-02-23 DIAGNOSIS — K76.6 PORTAL HYPERTENSION (HCC): ICD-10-CM

## 2025-02-23 DIAGNOSIS — K59.00 CONSTIPATION, UNSPECIFIED CONSTIPATION TYPE: ICD-10-CM

## 2025-02-23 DIAGNOSIS — I10 ESSENTIAL HYPERTENSION: Primary | ICD-10-CM

## 2025-02-23 LAB
ALBUMIN SERPL-MCNC: 4.1 G/DL (ref 3.5–5.2)
ALP SERPL-CCNC: 141 U/L (ref 35–104)
ALT SERPL-CCNC: 19 U/L (ref 0–32)
ANION GAP SERPL CALCULATED.3IONS-SCNC: 10 MMOL/L (ref 7–16)
AST SERPL-CCNC: 50 U/L (ref 0–31)
BACTERIA URNS QL MICRO: ABNORMAL
BASOPHILS # BLD: 0.03 K/UL (ref 0–0.2)
BASOPHILS NFR BLD: 1 % (ref 0–2)
BILIRUB SERPL-MCNC: 0.5 MG/DL (ref 0–1.2)
BILIRUB UR QL STRIP: NEGATIVE
BUN SERPL-MCNC: 19 MG/DL (ref 6–23)
CALCIUM SERPL-MCNC: 9.4 MG/DL (ref 8.6–10.2)
CHLORIDE SERPL-SCNC: 106 MMOL/L (ref 98–107)
CLARITY UR: CLEAR
CO2 SERPL-SCNC: 23 MMOL/L (ref 22–29)
COLOR UR: YELLOW
CREAT SERPL-MCNC: 0.6 MG/DL (ref 0.5–1)
EOSINOPHIL # BLD: 0.06 K/UL (ref 0.05–0.5)
EOSINOPHILS RELATIVE PERCENT: 1 % (ref 0–6)
ERYTHROCYTE [DISTWIDTH] IN BLOOD BY AUTOMATED COUNT: 13.6 % (ref 11.5–15)
GFR, ESTIMATED: >90 ML/MIN/1.73M2
GLUCOSE SERPL-MCNC: 118 MG/DL (ref 74–99)
GLUCOSE UR STRIP-MCNC: NEGATIVE MG/DL
HCT VFR BLD AUTO: 46.9 % (ref 34–48)
HGB BLD-MCNC: 15.8 G/DL (ref 11.5–15.5)
HGB UR QL STRIP.AUTO: ABNORMAL
IMM GRANULOCYTES # BLD AUTO: <0.03 K/UL (ref 0–0.58)
IMM GRANULOCYTES NFR BLD: 0 % (ref 0–5)
KETONES UR STRIP-MCNC: NEGATIVE MG/DL
LACTATE BLDV-SCNC: 1.1 MMOL/L (ref 0.5–2.2)
LEUKOCYTE ESTERASE UR QL STRIP: NEGATIVE
LIPASE SERPL-CCNC: 34 U/L (ref 13–60)
LYMPHOCYTES NFR BLD: 1.26 K/UL (ref 1.5–4)
LYMPHOCYTES RELATIVE PERCENT: 21 % (ref 20–42)
MCH RBC QN AUTO: 30.2 PG (ref 26–35)
MCHC RBC AUTO-ENTMCNC: 33.7 G/DL (ref 32–34.5)
MCV RBC AUTO: 89.5 FL (ref 80–99.9)
MONOCYTES NFR BLD: 0.58 K/UL (ref 0.1–0.95)
MONOCYTES NFR BLD: 10 % (ref 2–12)
NEUTROPHILS NFR BLD: 68 % (ref 43–80)
NEUTS SEG NFR BLD: 4.1 K/UL (ref 1.8–7.3)
NITRITE UR QL STRIP: NEGATIVE
PH UR STRIP: 6 [PH] (ref 5–8)
PLATELET # BLD AUTO: 89 K/UL (ref 130–450)
PLATELET CONFIRMATION: NORMAL
PMV BLD AUTO: 12.4 FL (ref 7–12)
POTASSIUM SERPL-SCNC: 4.9 MMOL/L (ref 3.5–5)
PROT SERPL-MCNC: 7.5 G/DL (ref 6.4–8.3)
PROT UR STRIP-MCNC: NEGATIVE MG/DL
RBC # BLD AUTO: 5.24 M/UL (ref 3.5–5.5)
RBC #/AREA URNS HPF: ABNORMAL /HPF
SODIUM SERPL-SCNC: 139 MMOL/L (ref 132–146)
SP GR UR STRIP: 1.01 (ref 1–1.03)
UROBILINOGEN UR STRIP-ACNC: 1 EU/DL (ref 0–1)
WBC #/AREA URNS HPF: ABNORMAL /HPF
WBC OTHER # BLD: 6 K/UL (ref 4.5–11.5)

## 2025-02-23 PROCEDURE — 96374 THER/PROPH/DIAG INJ IV PUSH: CPT

## 2025-02-23 PROCEDURE — 96375 TX/PRO/DX INJ NEW DRUG ADDON: CPT

## 2025-02-23 PROCEDURE — 81001 URINALYSIS AUTO W/SCOPE: CPT

## 2025-02-23 PROCEDURE — 85025 COMPLETE CBC W/AUTO DIFF WBC: CPT

## 2025-02-23 PROCEDURE — 99285 EMERGENCY DEPT VISIT HI MDM: CPT

## 2025-02-23 PROCEDURE — 83605 ASSAY OF LACTIC ACID: CPT

## 2025-02-23 PROCEDURE — 80053 COMPREHEN METABOLIC PANEL: CPT

## 2025-02-23 PROCEDURE — 6360000002 HC RX W HCPCS: Performed by: NURSE PRACTITIONER

## 2025-02-23 PROCEDURE — 74177 CT ABD & PELVIS W/CONTRAST: CPT

## 2025-02-23 PROCEDURE — 83690 ASSAY OF LIPASE: CPT

## 2025-02-23 PROCEDURE — 6360000004 HC RX CONTRAST MEDICATION: Performed by: RADIOLOGY

## 2025-02-23 RX ORDER — IOPAMIDOL 755 MG/ML
75 INJECTION, SOLUTION INTRAVASCULAR
Status: COMPLETED | OUTPATIENT
Start: 2025-02-23 | End: 2025-02-23

## 2025-02-23 RX ORDER — HYDRALAZINE HYDROCHLORIDE 20 MG/ML
10 INJECTION INTRAMUSCULAR; INTRAVENOUS ONCE
Status: COMPLETED | OUTPATIENT
Start: 2025-02-23 | End: 2025-02-23

## 2025-02-23 RX ORDER — MORPHINE SULFATE 2 MG/ML
2 INJECTION, SOLUTION INTRAMUSCULAR; INTRAVENOUS ONCE
Status: COMPLETED | OUTPATIENT
Start: 2025-02-23 | End: 2025-02-23

## 2025-02-23 RX ORDER — PROPRANOLOL HYDROCHLORIDE 40 MG/1
40 TABLET ORAL 2 TIMES DAILY
Qty: 180 TABLET | Refills: 3 | Status: SHIPPED | OUTPATIENT
Start: 2025-02-23

## 2025-02-23 RX ADMIN — MORPHINE SULFATE 2 MG: 2 INJECTION, SOLUTION INTRAMUSCULAR; INTRAVENOUS at 15:08

## 2025-02-23 RX ADMIN — HYDRALAZINE HYDROCHLORIDE 10 MG: 20 INJECTION INTRAMUSCULAR; INTRAVENOUS at 19:37

## 2025-02-23 RX ADMIN — IOPAMIDOL 75 ML: 755 INJECTION, SOLUTION INTRAVENOUS at 17:46

## 2025-02-23 ASSESSMENT — PAIN DESCRIPTION - FREQUENCY: FREQUENCY: CONTINUOUS

## 2025-02-23 ASSESSMENT — PAIN SCALES - GENERAL
PAINLEVEL_OUTOF10: 5
PAINLEVEL_OUTOF10: 4
PAINLEVEL_OUTOF10: 4

## 2025-02-23 ASSESSMENT — PAIN DESCRIPTION - LOCATION
LOCATION: ABDOMEN
LOCATION: ABDOMEN

## 2025-02-23 ASSESSMENT — PAIN DESCRIPTION - DESCRIPTORS
DESCRIPTORS: ACHING;DISCOMFORT
DESCRIPTORS: ACHING
DESCRIPTORS: ACHING

## 2025-02-23 ASSESSMENT — PAIN DESCRIPTION - ONSET: ONSET: SUDDEN

## 2025-02-23 ASSESSMENT — PAIN - FUNCTIONAL ASSESSMENT
PAIN_FUNCTIONAL_ASSESSMENT: 0-10
PAIN_FUNCTIONAL_ASSESSMENT: 0-10

## 2025-02-23 ASSESSMENT — PAIN DESCRIPTION - ORIENTATION
ORIENTATION: RIGHT;LOWER
ORIENTATION: RIGHT
ORIENTATION: RIGHT;LOWER

## 2025-02-23 NOTE — ED PROVIDER NOTES
Independent [facility] Visit.        [facility] EMERGENCY DEPARTMENT  ED  Encounter Note  Admit Date/RoomTime: 2025  2:46 PM  ED Room:   NAME: Lauryn Linda  : 1955  MRN: [id number]  PCP: Quynh Sharp DO    CHIEF COMPLAINT     Abdominal Pain (X 5 days.  -N/V.)    HISTORY OF PRESENT ILLNESS        Lauryn Linda is a 69 y.o. female who presents to the ED with complaints of right lower quadrant abdominal pain that has been ongoing for the past 5 days.  Patient denies fever or chills.  Patient denies nausea, vomiting, or diarrhea.  Patient states history of diverticulosis.  Patient states she still has her appendix but her gallbladder has been removed.  Patient states she is never experienced pain like this before.  Patient denies dysuria.  Patient denies history of kidney stones.  Patient denies injury to the right lower abdomen.    REVIEW OF SYSTEMS     Pertinent positives and negatives are stated within HPI, all other systems reviewed and are negative.    Past Medical History:  has a past medical history of Depression and Hypertension.  Surgical History:  has a past surgical history that includes Ovary removal; Breast surgery; laparoscopy; and Cholecystectomy (2017).  Social History:  reports that she has been smoking cigarettes. She has been exposed to tobacco smoke. She has never used smokeless tobacco. She reports that she does not drink alcohol and does not use drugs.  Family History: family history is not on file.   Allergies: Paxil [paroxetine] and Penicillins  CURRENT MEDICATIONS       Current Discharge Medication List          SCREENINGS     [place] Coma Scale  Eye Opening: Spontaneous  Best Verbal Response: Oriented  Best Motor Response: Obeys commands  Bian[place] Coma Scale Score: 15         CIWA Assessment  BP: (!) 182/92  Pulse: 79       PHYSICAL EXAM   Oxygen Saturation Interpretation: Normal on room air analysis.        ED Triage Vitals   BP Systolic BP Percentile

## 2025-03-14 ENCOUNTER — OFFICE VISIT (OUTPATIENT)
Dept: FAMILY MEDICINE CLINIC | Age: 70
End: 2025-03-14
Payer: MEDICARE

## 2025-03-14 VITALS
SYSTOLIC BLOOD PRESSURE: 134 MMHG | DIASTOLIC BLOOD PRESSURE: 86 MMHG | TEMPERATURE: 97.7 F | OXYGEN SATURATION: 97 % | HEART RATE: 64 BPM

## 2025-03-14 DIAGNOSIS — M79.651 RIGHT THIGH PAIN: ICD-10-CM

## 2025-03-14 DIAGNOSIS — M54.50 CHRONIC RIGHT-SIDED LOW BACK PAIN, UNSPECIFIED WHETHER SCIATICA PRESENT: Primary | ICD-10-CM

## 2025-03-14 DIAGNOSIS — G89.29 CHRONIC RIGHT-SIDED LOW BACK PAIN, UNSPECIFIED WHETHER SCIATICA PRESENT: Primary | ICD-10-CM

## 2025-03-14 PROCEDURE — 3017F COLORECTAL CA SCREEN DOC REV: CPT

## 2025-03-14 PROCEDURE — G8427 DOCREV CUR MEDS BY ELIG CLIN: HCPCS

## 2025-03-14 PROCEDURE — G8417 CALC BMI ABV UP PARAM F/U: HCPCS

## 2025-03-14 PROCEDURE — 1090F PRES/ABSN URINE INCON ASSESS: CPT

## 2025-03-14 PROCEDURE — 1123F ACP DISCUSS/DSCN MKR DOCD: CPT

## 2025-03-14 PROCEDURE — 4004F PT TOBACCO SCREEN RCVD TLK: CPT

## 2025-03-14 PROCEDURE — 99214 OFFICE O/P EST MOD 30 MIN: CPT

## 2025-03-14 PROCEDURE — 3075F SYST BP GE 130 - 139MM HG: CPT

## 2025-03-14 PROCEDURE — G8400 PT W/DXA NO RESULTS DOC: HCPCS

## 2025-03-14 PROCEDURE — 1159F MED LIST DOCD IN RCRD: CPT

## 2025-03-14 PROCEDURE — 3079F DIAST BP 80-89 MM HG: CPT

## 2025-03-14 RX ORDER — METHYLPREDNISOLONE 4 MG/1
TABLET ORAL
Qty: 1 KIT | Refills: 0 | Status: SHIPPED | OUTPATIENT
Start: 2025-03-14

## 2025-03-14 RX ORDER — TIZANIDINE 2 MG/1
2 TABLET ORAL EVERY 8 HOURS PRN
Qty: 21 TABLET | Refills: 0 | Status: SHIPPED | OUTPATIENT
Start: 2025-03-14

## 2025-03-14 SDOH — ECONOMIC STABILITY: FOOD INSECURITY: WITHIN THE PAST 12 MONTHS, YOU WORRIED THAT YOUR FOOD WOULD RUN OUT BEFORE YOU GOT MONEY TO BUY MORE.: NEVER TRUE

## 2025-03-14 SDOH — ECONOMIC STABILITY: FOOD INSECURITY: WITHIN THE PAST 12 MONTHS, THE FOOD YOU BOUGHT JUST DIDN'T LAST AND YOU DIDN'T HAVE MONEY TO GET MORE.: NEVER TRUE

## 2025-03-14 ASSESSMENT — PATIENT HEALTH QUESTIONNAIRE - PHQ9
SUM OF ALL RESPONSES TO PHQ QUESTIONS 1-9: 0
6. FEELING BAD ABOUT YOURSELF - OR THAT YOU ARE A FAILURE OR HAVE LET YOURSELF OR YOUR FAMILY DOWN: NOT AT ALL
SUM OF ALL RESPONSES TO PHQ QUESTIONS 1-9: 0
9. THOUGHTS THAT YOU WOULD BE BETTER OFF DEAD, OR OF HURTING YOURSELF: NOT AT ALL
4. FEELING TIRED OR HAVING LITTLE ENERGY: NOT AT ALL
5. POOR APPETITE OR OVEREATING: NOT AT ALL
3. TROUBLE FALLING OR STAYING ASLEEP: NOT AT ALL
1. LITTLE INTEREST OR PLEASURE IN DOING THINGS: NOT AT ALL
10. IF YOU CHECKED OFF ANY PROBLEMS, HOW DIFFICULT HAVE THESE PROBLEMS MADE IT FOR YOU TO DO YOUR WORK, TAKE CARE OF THINGS AT HOME, OR GET ALONG WITH OTHER PEOPLE: NOT DIFFICULT AT ALL
8. MOVING OR SPEAKING SO SLOWLY THAT OTHER PEOPLE COULD HAVE NOTICED. OR THE OPPOSITE, BEING SO FIGETY OR RESTLESS THAT YOU HAVE BEEN MOVING AROUND A LOT MORE THAN USUAL: NOT AT ALL
SUM OF ALL RESPONSES TO PHQ QUESTIONS 1-9: 0
7. TROUBLE CONCENTRATING ON THINGS, SUCH AS READING THE NEWSPAPER OR WATCHING TELEVISION: NOT AT ALL
2. FEELING DOWN, DEPRESSED OR HOPELESS: NOT AT ALL
SUM OF ALL RESPONSES TO PHQ QUESTIONS 1-9: 0

## 2025-03-14 NOTE — PROGRESS NOTES
methylPREDNISolone (MEDROL DOSEPACK) 4 MG tablet; Take by mouth.  -     tiZANidine (ZANAFLEX) 2 MG tablet; Take 1 tablet by mouth every 8 hours as needed (muscle spasm)    Right thigh pain  -     methylPREDNISolone (MEDROL DOSEPACK) 4 MG tablet; Take by mouth.        Disposition:  Disposition: Discharge to home.    Vital signs, past medical history, medications, and allergies reviewed at visit.    Emphasize importance of following with PCP as this is basically hospital follow-up visit.  I did review chart extensively to review comprehensive workup from the ED.  Imaging significant for cirrhosis with portal hypertension.  Patient given number of 2 offices to call nearby to establish for PCP appointment this cannot be managed further through the walk-in.    Scripts written for Medrol Dosepak and Zanaflex, side effects discussed.     Advise rest, ice, and/or moist heat for additional relief.     F/u PCP in 1-2 weeks if symptoms persist. ED sooner if symptoms worsen or change. ED immediately with any fever, severe or worsening back pain, paresthesias, weakness, or GI/ incontinence. Pt states understanding and is in agreement with this care plan. All questions answered.    RODRIGO Simon    **This report was transcribed using voice recognition software. The patient (or guardian, if applicable) and other individuals in attendance with the patient were advised that if Artificial Intelligence would be utilized during this visit to record and process the conversation to generate a clinical note they would be informed prior to start of the visit. The patient (or guardian, if applicable) and other individuals in attendance at the appointment consented to the use of AI if was utilized, including the recording.  Every effort was made to ensure accuracy; however, inadvertent computerized transcription errors may be present.

## 2025-03-18 ENCOUNTER — APPOINTMENT (OUTPATIENT)
Dept: ULTRASOUND IMAGING | Age: 70
End: 2025-03-18
Payer: MEDICARE

## 2025-03-18 ENCOUNTER — HOSPITAL ENCOUNTER (EMERGENCY)
Age: 70
Discharge: HOME | End: 2025-03-18
Payer: MEDICARE

## 2025-03-18 ENCOUNTER — APPOINTMENT (OUTPATIENT)
Dept: CT IMAGING | Age: 70
End: 2025-03-18
Payer: MEDICARE

## 2025-03-18 VITALS
SYSTOLIC BLOOD PRESSURE: 190 MMHG | TEMPERATURE: 97.9 F | DIASTOLIC BLOOD PRESSURE: 100 MMHG | HEART RATE: 57 BPM | BODY MASS INDEX: 33.89 KG/M2 | WEIGHT: 210 LBS | OXYGEN SATURATION: 97 % | RESPIRATION RATE: 16 BRPM

## 2025-03-18 DIAGNOSIS — M54.41 ACUTE RIGHT-SIDED LOW BACK PAIN WITH RIGHT-SIDED SCIATICA: Primary | ICD-10-CM

## 2025-03-18 LAB
ALBUMIN SERPL-MCNC: 4 G/DL (ref 3.5–5.2)
ALP SERPL-CCNC: 134 U/L (ref 35–104)
ALT SERPL-CCNC: 24 U/L (ref 0–32)
ANION GAP SERPL CALCULATED.3IONS-SCNC: 11 MMOL/L (ref 7–16)
AST SERPL-CCNC: 46 U/L (ref 0–31)
BACTERIA URNS QL MICRO: ABNORMAL
BASOPHILS # BLD: 0.03 K/UL (ref 0–0.2)
BASOPHILS NFR BLD: 0 % (ref 0–2)
BILIRUB SERPL-MCNC: 0.5 MG/DL (ref 0–1.2)
BILIRUB UR QL STRIP: NEGATIVE
BUN SERPL-MCNC: 26 MG/DL (ref 6–23)
CALCIUM SERPL-MCNC: 9.3 MG/DL (ref 8.6–10.2)
CHLORIDE SERPL-SCNC: 105 MMOL/L (ref 98–107)
CLARITY UR: CLEAR
CO2 SERPL-SCNC: 24 MMOL/L (ref 22–29)
COLOR UR: YELLOW
CREAT SERPL-MCNC: 0.7 MG/DL (ref 0.5–1)
EOSINOPHIL # BLD: 0.11 K/UL (ref 0.05–0.5)
EOSINOPHILS RELATIVE PERCENT: 1 % (ref 0–6)
EPI CELLS #/AREA URNS HPF: ABNORMAL /HPF
ERYTHROCYTE [DISTWIDTH] IN BLOOD BY AUTOMATED COUNT: 13.5 % (ref 11.5–15)
GFR, ESTIMATED: >90 ML/MIN/1.73M2
GLUCOSE SERPL-MCNC: 131 MG/DL (ref 74–99)
GLUCOSE UR STRIP-MCNC: NEGATIVE MG/DL
HCT VFR BLD AUTO: 45.2 % (ref 34–48)
HGB BLD-MCNC: 15.1 G/DL (ref 11.5–15.5)
HGB UR QL STRIP.AUTO: NEGATIVE
IMM GRANULOCYTES # BLD AUTO: 0.03 K/UL (ref 0–0.58)
IMM GRANULOCYTES NFR BLD: 0 % (ref 0–5)
KETONES UR STRIP-MCNC: ABNORMAL MG/DL
LACTATE BLDV-SCNC: 1.1 MMOL/L (ref 0.5–2.2)
LEUKOCYTE ESTERASE UR QL STRIP: NEGATIVE
LIPASE SERPL-CCNC: 42 U/L (ref 13–60)
LYMPHOCYTES NFR BLD: 2 K/UL (ref 1.5–4)
LYMPHOCYTES RELATIVE PERCENT: 25 % (ref 20–42)
MAGNESIUM SERPL-MCNC: 1.9 MG/DL (ref 1.6–2.6)
MCH RBC QN AUTO: 29.6 PG (ref 26–35)
MCHC RBC AUTO-ENTMCNC: 33.4 G/DL (ref 32–34.5)
MCV RBC AUTO: 88.6 FL (ref 80–99.9)
MONOCYTES NFR BLD: 0.82 K/UL (ref 0.1–0.95)
MONOCYTES NFR BLD: 10 % (ref 2–12)
NEUTROPHILS NFR BLD: 63 % (ref 43–80)
NEUTS SEG NFR BLD: 5.02 K/UL (ref 1.8–7.3)
NITRITE UR QL STRIP: NEGATIVE
PH UR STRIP: 6 [PH] (ref 5–8)
PLATELET # BLD AUTO: 103 K/UL (ref 130–450)
PMV BLD AUTO: 12.4 FL (ref 7–12)
POTASSIUM SERPL-SCNC: 4.2 MMOL/L (ref 3.5–5)
PROT SERPL-MCNC: 7 G/DL (ref 6.4–8.3)
PROT UR STRIP-MCNC: NEGATIVE MG/DL
RBC # BLD AUTO: 5.1 M/UL (ref 3.5–5.5)
RBC #/AREA URNS HPF: ABNORMAL /HPF
SODIUM SERPL-SCNC: 140 MMOL/L (ref 132–146)
SP GR UR STRIP: >1.03 (ref 1–1.03)
UROBILINOGEN UR STRIP-ACNC: >8 EU/DL (ref 0–1)
WBC #/AREA URNS HPF: ABNORMAL /HPF
WBC OTHER # BLD: 8 K/UL (ref 4.5–11.5)

## 2025-03-18 PROCEDURE — 83690 ASSAY OF LIPASE: CPT

## 2025-03-18 PROCEDURE — 83735 ASSAY OF MAGNESIUM: CPT

## 2025-03-18 PROCEDURE — 6370000000 HC RX 637 (ALT 250 FOR IP): Performed by: EMERGENCY MEDICINE

## 2025-03-18 PROCEDURE — 99285 EMERGENCY DEPT VISIT HI MDM: CPT

## 2025-03-18 PROCEDURE — 6360000002 HC RX W HCPCS: Performed by: NURSE PRACTITIONER

## 2025-03-18 PROCEDURE — 6360000004 HC RX CONTRAST MEDICATION: Performed by: RADIOLOGY

## 2025-03-18 PROCEDURE — 96374 THER/PROPH/DIAG INJ IV PUSH: CPT

## 2025-03-18 PROCEDURE — 83605 ASSAY OF LACTIC ACID: CPT

## 2025-03-18 PROCEDURE — 72131 CT LUMBAR SPINE W/O DYE: CPT

## 2025-03-18 PROCEDURE — 74177 CT ABD & PELVIS W/CONTRAST: CPT

## 2025-03-18 PROCEDURE — 93971 EXTREMITY STUDY: CPT

## 2025-03-18 PROCEDURE — 80053 COMPREHEN METABOLIC PANEL: CPT

## 2025-03-18 PROCEDURE — 81001 URINALYSIS AUTO W/SCOPE: CPT

## 2025-03-18 PROCEDURE — 85025 COMPLETE CBC W/AUTO DIFF WBC: CPT

## 2025-03-18 RX ORDER — AMLODIPINE BESYLATE 5 MG/1
10 TABLET ORAL ONCE
Status: COMPLETED | OUTPATIENT
Start: 2025-03-18 | End: 2025-03-18

## 2025-03-18 RX ORDER — IOPAMIDOL 755 MG/ML
75 INJECTION, SOLUTION INTRAVASCULAR
Status: COMPLETED | OUTPATIENT
Start: 2025-03-18 | End: 2025-03-18

## 2025-03-18 RX ORDER — TRAMADOL HYDROCHLORIDE 50 MG/1
50 TABLET ORAL EVERY 6 HOURS PRN
Qty: 12 TABLET | Refills: 0 | Status: SHIPPED | OUTPATIENT
Start: 2025-03-18 | End: 2025-03-21

## 2025-03-18 RX ADMIN — IOPAMIDOL 75 ML: 755 INJECTION, SOLUTION INTRAVENOUS at 18:03

## 2025-03-18 RX ADMIN — HYDROMORPHONE HYDROCHLORIDE 0.5 MG: 1 INJECTION, SOLUTION INTRAMUSCULAR; INTRAVENOUS; SUBCUTANEOUS at 18:31

## 2025-03-18 RX ADMIN — AMLODIPINE BESYLATE 10 MG: 5 TABLET ORAL at 20:11

## 2025-03-18 ASSESSMENT — PAIN DESCRIPTION - DESCRIPTORS
DESCRIPTORS: ACHING;DULL;DISCOMFORT
DESCRIPTORS: ACHING;TENDER;DISCOMFORT;SORE

## 2025-03-18 ASSESSMENT — PAIN - FUNCTIONAL ASSESSMENT: PAIN_FUNCTIONAL_ASSESSMENT: PREVENTS OR INTERFERES WITH MANY ACTIVE NOT PASSIVE ACTIVITIES

## 2025-03-18 ASSESSMENT — PAIN DESCRIPTION - LOCATION
LOCATION: ABDOMEN;BACK;LEG
LOCATION: BACK;ABDOMEN

## 2025-03-18 ASSESSMENT — PAIN SCALES - GENERAL
PAINLEVEL_OUTOF10: 7
PAINLEVEL_OUTOF10: 7

## 2025-03-18 ASSESSMENT — PAIN DESCRIPTION - ORIENTATION
ORIENTATION: LOWER
ORIENTATION: RIGHT;MID;LOWER

## 2025-03-18 NOTE — ED PROVIDER NOTES
[facility].        [facility] EMERGENCY DEPARTMENT  ED  Encounter Note  Admit Date/RoomTime: 3/18/2025  3:32 PM  ED Room:   NAME: Lauryn Linda  : 1955  MRN: [id number]  PCP: No primary care provider on file.    CHIEF COMPLAINT     Abdominal Pain (Ongoing since February, as seen here for same thing x2 weeks ago; stated she was told nothing was found and it was muscular), Back Pain (Ongoing since February, was seen here for same reason), and Leg Pain (Right leg pain with walking)    HISTORY OF PRESENT ILLNESS        Lauryn Linda is a 69 y.o. female who presents to the ED with complaints of right lower quadrant abdominal pain, right thigh pain, and right low back pain that has been ongoing for approximately 1 month.  Patient was seen 5 days ago by her PCP and was prescribed a Medrol Dosepak as well as a muscle relaxant.  Patient states right lower quadrant abdominal pain comes and goes and is is mild at the present.  Patient states right low back pain that radiates to the right thigh which has recreated some weakness to the right leg is persisting.  Patient denies numbness or tingling.  Patient denies swelling of the right leg.  Patient states no injuries to the right leg.  Patient denies fever or chills.  Patient denies nausea, vomiting, or diarrhea.  Patient denies dysuria.    REVIEW OF SYSTEMS     Pertinent positives and negatives are stated within HPI, all other systems reviewed and are negative.    Past Medical History:  has a past medical history of Depression and Hypertension.  Surgical History:  has a past surgical history that includes Ovary removal; Breast surgery; laparoscopy; and Cholecystectomy (2017).  Social History:  reports that she has been smoking cigarettes. She started smoking about 2 months ago. She has a 0.2 pack-year smoking history. She has been exposed to tobacco smoke. She has never used smokeless tobacco. She reports that she does not drink

## 2025-03-18 NOTE — DISCHARGE INSTRUCTIONS
Continue taking prednisone until gone.  May continue taking Flexeril as needed for back spasms.  May continue taking Motrin or Tylenol as needed for lesser pain.  Rest when practical

## 2025-05-13 ENCOUNTER — HOSPITAL ENCOUNTER (OUTPATIENT)
Dept: GENERAL RADIOLOGY | Age: 70
Discharge: HOME | End: 2025-05-15
Payer: MEDICARE

## 2025-05-13 ENCOUNTER — HOSPITAL ENCOUNTER (OUTPATIENT)
Age: 70
Discharge: HOME | End: 2025-05-15
Payer: MEDICARE

## 2025-05-13 DIAGNOSIS — R52 PAIN: ICD-10-CM

## 2025-05-13 PROCEDURE — 73521 X-RAY EXAM HIPS BI 2 VIEWS: CPT

## 2025-05-19 ENCOUNTER — TRANSCRIBE ORDERS (OUTPATIENT)
Dept: ADMINISTRATIVE | Age: 70
End: 2025-05-19

## 2025-05-19 DIAGNOSIS — Z00.00 ROUTINE GENERAL MEDICAL EXAMINATION AT A HEALTH CARE FACILITY: Primary | ICD-10-CM
